# Patient Record
Sex: MALE | Race: WHITE
[De-identification: names, ages, dates, MRNs, and addresses within clinical notes are randomized per-mention and may not be internally consistent; named-entity substitution may affect disease eponyms.]

---

## 2019-12-18 ENCOUNTER — HOSPITAL ENCOUNTER (EMERGENCY)
Dept: HOSPITAL 1 - ED | Age: 34
Discharge: HOME | End: 2019-12-18
Payer: COMMERCIAL

## 2019-12-18 ENCOUNTER — HOSPITAL ENCOUNTER (EMERGENCY)
Dept: HOSPITAL 1 - ED | Age: 34
Discharge: LEFT BEFORE DISCHARGE | End: 2019-12-18
Payer: COMMERCIAL

## 2019-12-18 VITALS — DIASTOLIC BLOOD PRESSURE: 70 MMHG | SYSTOLIC BLOOD PRESSURE: 129 MMHG

## 2019-12-18 VITALS — WEIGHT: 200 LBS | BODY MASS INDEX: 30.31 KG/M2 | HEIGHT: 68 IN

## 2019-12-18 VITALS — BODY MASS INDEX: 32.62 KG/M2 | WEIGHT: 233 LBS | HEIGHT: 71 IN

## 2019-12-18 VITALS — DIASTOLIC BLOOD PRESSURE: 83 MMHG | SYSTOLIC BLOOD PRESSURE: 148 MMHG
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
## 2019-12-18 DIAGNOSIS — Z53.21: Primary | ICD-10-CM

## 2019-12-18 DIAGNOSIS — S61.412A: Primary | ICD-10-CM

## 2019-12-18 DIAGNOSIS — Z98.890: ICD-10-CM
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
## 2021-11-25 ENCOUNTER — HOSPITAL ENCOUNTER (EMERGENCY)
Dept: HOSPITAL 4 - SED | Age: 36
Discharge: HOME | End: 2021-11-25
Payer: COMMERCIAL

## 2021-11-25 VITALS — BODY MASS INDEX: 29.4 KG/M2 | HEIGHT: 71 IN | WEIGHT: 210 LBS

## 2021-11-25 VITALS — SYSTOLIC BLOOD PRESSURE: 129 MMHG
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
## 2021-11-25 DIAGNOSIS — S93.402A: Primary | ICD-10-CM
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
## 2021-11-25 NOTE — NUR
Patient given written and verbal discharge instructions and verbalizes 
understanding.  ER MD discussed with patient the results and treatment 
provided. Patient in stable condition. ID arm band removed. 

Rx of Norco and Ibuprofen given. Patient educated on pain management and to 
follow up with PMD. Pain Scale 1/10.

Opportunity for questions provided and answered. Medication side effect fact 
sheet provided.
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
